# Patient Record
Sex: MALE | Race: WHITE
[De-identification: names, ages, dates, MRNs, and addresses within clinical notes are randomized per-mention and may not be internally consistent; named-entity substitution may affect disease eponyms.]

---

## 2017-12-23 ENCOUNTER — HOSPITAL ENCOUNTER (OUTPATIENT)
Dept: HOSPITAL 92 - SCSER | Age: 72
Setting detail: OBSERVATION
LOS: 1 days | Discharge: HOME | End: 2017-12-24
Attending: FAMILY MEDICINE | Admitting: FAMILY MEDICINE
Payer: MEDICARE

## 2017-12-23 VITALS — BODY MASS INDEX: 38.5 KG/M2

## 2017-12-23 DIAGNOSIS — Z79.52: ICD-10-CM

## 2017-12-23 DIAGNOSIS — J09.X2: ICD-10-CM

## 2017-12-23 DIAGNOSIS — E03.9: ICD-10-CM

## 2017-12-23 DIAGNOSIS — G70.00: ICD-10-CM

## 2017-12-23 DIAGNOSIS — R60.9: ICD-10-CM

## 2017-12-23 DIAGNOSIS — E78.5: ICD-10-CM

## 2017-12-23 DIAGNOSIS — I10: ICD-10-CM

## 2017-12-23 DIAGNOSIS — Z79.51: ICD-10-CM

## 2017-12-23 DIAGNOSIS — Z98.890: ICD-10-CM

## 2017-12-23 DIAGNOSIS — J44.9: ICD-10-CM

## 2017-12-23 DIAGNOSIS — Z87.891: ICD-10-CM

## 2017-12-23 DIAGNOSIS — Z88.1: ICD-10-CM

## 2017-12-23 DIAGNOSIS — Z88.0: ICD-10-CM

## 2017-12-23 DIAGNOSIS — Z79.899: ICD-10-CM

## 2017-12-23 DIAGNOSIS — E86.0: Primary | ICD-10-CM

## 2017-12-23 DIAGNOSIS — I25.10: ICD-10-CM

## 2017-12-23 LAB
ALBUMIN SERPL BCG-MCNC: 3.9 G/DL (ref 3.4–4.8)
ALP SERPL-CCNC: 59 U/L (ref 40–150)
ALT SERPL W P-5'-P-CCNC: 27 U/L (ref 8–55)
ANION GAP SERPL CALC-SCNC: 17 MMOL/L (ref 10–20)
AST SERPL-CCNC: 37 U/L (ref 5–34)
BASOPHILS # BLD AUTO: 0.2 THOU/UL (ref 0–0.2)
BASOPHILS NFR BLD AUTO: 1.8 % (ref 0–1)
BILIRUB SERPL-MCNC: 0.3 MG/DL (ref 0.2–1.2)
BUN SERPL-MCNC: 13 MG/DL (ref 8.4–25.7)
CALCIUM SERPL-MCNC: 9.2 MG/DL (ref 7.8–10.44)
CHLORIDE SERPL-SCNC: 102 MMOL/L (ref 98–107)
CK MB SERPL-MCNC: 1.5 NG/ML (ref 0–6.6)
CK MB SERPL-MCNC: 2.2 NG/ML (ref 0–6.6)
CK SERPL-CCNC: 173 U/L (ref 30–200)
CO2 SERPL-SCNC: 26 MMOL/L (ref 23–31)
CREAT CL PREDICTED SERPL C-G-VRATE: 0 ML/MIN (ref 70–130)
EOSINOPHIL # BLD AUTO: 0.2 THOU/UL (ref 0–0.7)
EOSINOPHIL NFR BLD AUTO: 2.4 % (ref 0–10)
GLOBULIN SER CALC-MCNC: 3.3 G/DL (ref 2.4–3.5)
GLUCOSE SERPL-MCNC: 98 MG/DL (ref 83–110)
HGB BLD-MCNC: 13.6 G/DL (ref 14–18)
LYMPHOCYTES # BLD: 1 THOU/UL (ref 1.2–3.4)
LYMPHOCYTES NFR BLD AUTO: 11.9 % (ref 21–51)
MCH RBC QN AUTO: 29 PG (ref 27–31)
MCV RBC AUTO: 88.4 FL (ref 80–94)
MONOCYTES # BLD AUTO: 1.2 THOU/UL (ref 0.11–0.59)
MONOCYTES NFR BLD AUTO: 14.7 % (ref 0–10)
NEUTROPHILS # BLD AUTO: 5.8 THOU/UL (ref 1.4–6.5)
NEUTROPHILS NFR BLD AUTO: 69.1 % (ref 42–75)
PLATELET # BLD AUTO: 184 THOU/UL (ref 130–400)
POTASSIUM SERPL-SCNC: 3.7 MMOL/L (ref 3.5–5.1)
RBC # BLD AUTO: 4.67 MILL/UL (ref 4.7–6.1)
SODIUM SERPL-SCNC: 141 MMOL/L (ref 136–145)
TROPONIN I SERPL DL<=0.01 NG/ML-MCNC: (no result) NG/ML (ref ?–0.03)
TROPONIN I SERPL DL<=0.01 NG/ML-MCNC: 0.03 NG/ML (ref ?–0.03)
WBC # BLD AUTO: 8.3 THOU/UL (ref 4.8–10.8)

## 2017-12-23 PROCEDURE — 36415 COLL VENOUS BLD VENIPUNCTURE: CPT

## 2017-12-23 PROCEDURE — 83605 ASSAY OF LACTIC ACID: CPT

## 2017-12-23 PROCEDURE — 82553 CREATINE MB FRACTION: CPT

## 2017-12-23 PROCEDURE — G0378 HOSPITAL OBSERVATION PER HR: HCPCS

## 2017-12-23 PROCEDURE — 82550 ASSAY OF CK (CPK): CPT

## 2017-12-23 PROCEDURE — 83880 ASSAY OF NATRIURETIC PEPTIDE: CPT

## 2017-12-23 PROCEDURE — 85025 COMPLETE CBC W/AUTO DIFF WBC: CPT

## 2017-12-23 PROCEDURE — 96361 HYDRATE IV INFUSION ADD-ON: CPT

## 2017-12-23 PROCEDURE — 80048 BASIC METABOLIC PNL TOTAL CA: CPT

## 2017-12-23 PROCEDURE — 99285 EMERGENCY DEPT VISIT HI MDM: CPT

## 2017-12-23 PROCEDURE — 94640 AIRWAY INHALATION TREATMENT: CPT

## 2017-12-23 PROCEDURE — 93005 ELECTROCARDIOGRAM TRACING: CPT

## 2017-12-23 PROCEDURE — 87804 INFLUENZA ASSAY W/OPTIC: CPT

## 2017-12-23 PROCEDURE — 96376 TX/PRO/DX INJ SAME DRUG ADON: CPT

## 2017-12-23 PROCEDURE — 87040 BLOOD CULTURE FOR BACTERIA: CPT

## 2017-12-23 PROCEDURE — 84484 ASSAY OF TROPONIN QUANT: CPT

## 2017-12-23 PROCEDURE — 94760 N-INVAS EAR/PLS OXIMETRY 1: CPT

## 2017-12-23 PROCEDURE — 80053 COMPREHEN METABOLIC PANEL: CPT

## 2017-12-23 PROCEDURE — 96374 THER/PROPH/DIAG INJ IV PUSH: CPT

## 2017-12-23 PROCEDURE — 93306 TTE W/DOPPLER COMPLETE: CPT

## 2017-12-23 PROCEDURE — 71020: CPT

## 2017-12-23 RX ADMIN — PYRIDOSTIGMINE BROMIDE SCH MG: 60 TABLET ORAL at 23:51

## 2017-12-23 NOTE — RAD
PA AND LATERAL CHEST:

 

Date:  12/23/17 

 

HISTORY:  

Dyspnea. Fever at home. 

 

FINDINGS:

 

Comparison made to previous exam from 01/07/10. 

 

Two views of chest demonstrate area of elevation of the left hemidiaphragm, unchanged since the previ
ous comparison exam. Some areas of scar seen in the right lung base. Mild pulmonary vascular congesti
on seen. No acute intrathoracic abnormalities noted. 

 

IMPRESSION: 

Elevated left hemidiaphragm. Otherwise unremarkable 2 views chest with areas of scarring seen in the 
right lung base. 

 

 

POS: [facility]

## 2017-12-23 NOTE — HP
PRIMARY CARE PHYSICIAN:  Dr. Orlando Hensley.

 

DATE OF ADMISSION:  12/23/2017

 

CHIEF COMPLAINT:  Cough, shortness of breath, and weakness.

 

HISTORY OF PRESENT ILLNESS:  This is a 72-year-old gentleman, a patient of Dr. Orlando Hensley with a histo
ry of COPD, coronary artery disease, myasthenia gravis, who presents to the Emergency Department with
 feeling worse over the past week.  The patient states that about 3 or 4 days ago, he developed worse
karo of his cough that seem different from his typical bronchitis, COPD exacerbation.  Two days ago, 
he developed increased body aches, body pains, muscle aches, malaise and 2 days ago, developed a feve
r up to 101 with chills.  He started his usual regimen of taking oral Ceftin 3-4 days ago with no rel
ief.  He admits to decreased appetite, but no nausea, vomiting, diarrhea, no falling at home, no sync
ope, but admits to increased dyspnea on exertion.  He presented to the Emergency Department today and
 was found to be hypoxic with a pulse ox down to 80% with activity.  He had a positive flu swab, now 
being admitted for fluid dehydration as well as mild exacerbation of his COPD.

 

PAST MEDICAL HISTORY:

1.  COPD, followed by Dr. Cruz.

2.  Myasthenia gravis followed by Dr. Gamboa

3.  Coronary artery disease followed by Dr. Connell.

4.  Chronic edema.

5.  Hypothyroidism.

6.  Hyperlipidemia.

 

PAST SURGICAL HISTORY:  Nasal polyp, sinus surgery, foot surgery.

 

MEDICATIONS:  Include, aspirin 81 mg daily, Crestor 20 mg daily, levothyroxine 75 mcg daily, metoprol
ol 20 mg daily, prednisone 5 mg b.i.d., Mestinon 90 mg t.i.d., Protonix 40 mg b.i.d., Symbicort 2 puf
fs b.i.d., Combivent as needed, and Ceftin as needed.

 

ALLERGIES:  Include LEVAQUIN and PENICILLIN.

 

SOCIAL HISTORY:  A former smoker, quit over 20 years ago, but smoked for over 25 years.  Denies alcoh
ol or illicit drug use.  He is , retired.

 

FAMILY HISTORY:  Multiple family members with coronary artery disease and diabetes.

 

REVIEW OF SYSTEMS:  As per the history of present illness.

GENERAL:  He denies any recent illness prior to 3-4 days ago.

HEENT:  Denies headache, visual or hearing changes.

CARDIOVASCULAR:  Denies chest pain.  Positive for shortness of breath, no palpitations.

PULMONARY:  Positive cough.  Positive chest congestion.  Denies hemoptysis.

GASTROINTESTINAL:  Denies nausea, vomiting, abdominal pain, melena, hematochezia.

GENITOURINARY:  Denies dysuria or hematuria.

NEUROLOGIC:  Positive weakness.  No seizures or syncope.

MUSCULOSKELETAL:  Occasional joint pains.

 

PHYSICAL EXAMINATION:

VITAL SIGNS:  Temperature 99.0, pulse of 98, respirations 20, pulse ox 93% to 95% on 2 liters, and bl
ood pressure 170/90.

GENERAL:  He is awake and alert, in no acute distress.  No conversational dyspnea.

NECK:  Supple.  Mucosa is dry.

HEART:  Regular rate and rhythm.

LUNGS:  With scattered rhonchi bilaterally with expiratory wheezes.

ABDOMEN:  Obese, soft, nontender, nondistended.

EXTREMITIES:  With 2-3+ edema with chronic venous stasis changes with ulcerations, some redness but n
o sign of acute cellulitis.

NEUROLOGIC:  Cranial nerves II through XII are grossly intact.  Strength is 5/5 bilaterally.

 

LABORATORY DATA:  White blood cell count 8.3000, hemoglobin and hematocrit 13.6 and 41.3, platelets o
f 184.  Sodium 141, potassium 3.7, chloride 102, CO2 of 26.  BUN and creatinine 13 and 0.72.  Serum g
lucose is 98.  GFR greater than 90.  AST and ALT are normal.  CPK was normal.  Troponin I was indeter
minate 0.030.  BNP was less than 10.

 

X-RAY FINDINGS:  Chest x-ray revealed elevated left hemidiaphragm, scarring in the right lung base, b
ut otherwise no acute infiltrate.

 

ASSESSMENT AND PLAN:  This is a 72-year-old gentleman with multiple medical problems including myasth
enia gravis, chronic obstructive pulmonary disease, coronary artery disease, now with acute viral ill
ness consistent with influenza type A, now with positive flu test.

1.  Influenza type A.  We will start Tamiflu 75 mg b.i.d.

2.  Dehydration.  We will start IV fluid management, try to push his fluids.

3.  Chronic obstructive pulmonary disease.  I will continue steroids, nebulizer treatments, as well a
s oxygen therapy.

4.  Coronary artery disease, stable.  We will continue his aspirin and Crestor.

5.  Hypertension.  We will monitor closely.  We will continue metoprolol and start hydralazine p.r.n.


6.  Disposition:  Hope for a short hospital stay.  Once he is improving, hopefully discharge home moon
izaiah.

## 2017-12-24 VITALS — TEMPERATURE: 97.6 F | SYSTOLIC BLOOD PRESSURE: 146 MMHG | DIASTOLIC BLOOD PRESSURE: 78 MMHG

## 2017-12-24 LAB
ANION GAP SERPL CALC-SCNC: 10 MMOL/L (ref 10–20)
BASOPHILS # BLD AUTO: 0 THOU/UL (ref 0–0.2)
BASOPHILS NFR BLD AUTO: 0 % (ref 0–1)
BUN SERPL-MCNC: 14 MG/DL (ref 8.4–25.7)
CALCIUM SERPL-MCNC: 8.7 MG/DL (ref 7.8–10.44)
CHLORIDE SERPL-SCNC: 102 MMOL/L (ref 98–107)
CO2 SERPL-SCNC: 31 MMOL/L (ref 23–31)
CREAT CL PREDICTED SERPL C-G-VRATE: 192 ML/MIN (ref 70–130)
EOSINOPHIL # BLD AUTO: 0 THOU/UL (ref 0–0.7)
EOSINOPHIL NFR BLD AUTO: 0.3 % (ref 0–10)
GLUCOSE SERPL-MCNC: 127 MG/DL (ref 83–110)
HGB BLD-MCNC: 13.1 G/DL (ref 14–18)
LYMPHOCYTES # BLD: 0.4 THOU/UL (ref 1.2–3.4)
LYMPHOCYTES NFR BLD AUTO: 10.1 % (ref 21–51)
MCH RBC QN AUTO: 29.5 PG (ref 27–31)
MCV RBC AUTO: 92.8 FL (ref 80–94)
MONOCYTES # BLD AUTO: 0.2 THOU/UL (ref 0.11–0.59)
MONOCYTES NFR BLD AUTO: 4.4 % (ref 0–10)
NEUTROPHILS # BLD AUTO: 3.4 THOU/UL (ref 1.4–6.5)
NEUTROPHILS NFR BLD AUTO: 85.1 % (ref 42–75)
PLATELET # BLD AUTO: 199 THOU/UL (ref 130–400)
POTASSIUM SERPL-SCNC: 4 MMOL/L (ref 3.5–5.1)
RBC # BLD AUTO: 4.46 MILL/UL (ref 4.7–6.1)
SODIUM SERPL-SCNC: 139 MMOL/L (ref 136–145)
WBC # BLD AUTO: 4 THOU/UL (ref 4.8–10.8)

## 2017-12-24 RX ADMIN — PYRIDOSTIGMINE BROMIDE SCH MG: 60 TABLET ORAL at 08:04

## 2017-12-24 NOTE — DIS
DATE OF ADMISSION:  12/23/2017

 

DATE OF DISCHARGE:  12/24/2017

 

ADMISSION DIAGNOSES:

1.  Influenza.

2.  Dehydration.

3.  Chronic obstructive pulmonary disease.

4.  Coronary artery disease with slightly elevated troponin.

 

DISCHARGE DIAGNOSES:

1.  Dehydration, resolved.

2.  Influenza, stable.

3.  Chronic obstructive pulmonary disease at his baseline.

 

PROCEDURES:  Telemetry monitoring rule out myocardial infarction protocol, chest x-ray, Tamiflu thera
py.

 

HOSPITAL COURSE:  This is a 72-year-old gentleman with a history of COPD, coronary artery disease, an
d with myasthenia gravis, who presented to the emergency department with worsening cough, malaise, an
d fever over the past week.  He presented to the emergency department, was hypoxic with a pulse ox do
wn to 80% with walking around.  He has a positive flu swab.  He was started on Tamiflu, as well as IV
 fluid rehydration, steroids, and neb treatments.  He improved during his hospitalization.  He feels 
much better, close to his baseline.  His lung exam improved and he was able to walk to the bathroom w
ith less difficulty.  He is tolerating the Tamiflu, taking fluids well now, and stable for discharge.


 

DISCHARGE PHYSICAL EXAMINATION:

VITAL SIGNS:  Temperature 97.6, T-max of 99, pulse of 78, respirations 18, blood pressure 146/78, pul
se ox is 91% on 3 liters.

GENERAL:  He is awake and alert.  No conversational dyspnea.

NECK:  Supple.

HEART:  Regular rate and rhythm.

LUNGS:  With distant breath sounds, occasional wheezes but no rhonchi.

ABDOMEN:  Obese.

EXTREMITIES:  With 1-2+ edema, but he states that is his baseline.

 

LABORATORY DATA:  Troponin I less than 0.01.  BNP was less than 10.  Sodium 139, potassium 4.0, chlor
gregory 102, CO2 of 31, BUN and creatinine 14 and 0.6, serum glucose of 127. White blood cell count 4.0, 
hemoglobin and hematocrit 13.1 and 41.4, platelets of 199.

 

DISCHARGE MEDICATIONS:  Include DuoNeb p.r.n., Mucinex 600 mg q.12 hours, hydralazine 25 mg q.i.d. p.
r.n., levothyroxine 75 mcg daily, a steroid taper, Lopressor 25 mg daily, Tamiflu 75 mg b.i.d. for 4 
more days, Protonix 40 mg daily, Mestinon 90 mg t.i.d., Symbicort b.i.d.

 

FOLLOWUP INSTRUCTIONS:  The patient to follow up with Dr. Hensley in 1-2 weeks.

## 2018-02-10 NOTE — EKG
Test Reason : 

Blood Pressure : ***/*** mmHG

Vent. Rate : 106 BPM     Atrial Rate : 106 BPM

   P-R Int : 164 ms          QRS Dur : 094 ms

    QT Int : 334 ms       P-R-T Axes : 024 012 115 degrees

   QTc Int : 443 ms

 

Sinus tachycardia



Abnormal ECG

 

Confirmed by XAVI PENDLETON ([id number]),  PARVIN GAVIN ([id number]) on 2/10/2018 6:23:55 PM

 

Referred By:             Confirmed By:XAVI PENDLETON

## 2019-02-19 ENCOUNTER — HOSPITAL ENCOUNTER (OUTPATIENT)
Dept: HOSPITAL 92 - SCSULT | Age: 74
Discharge: HOME | End: 2019-02-19
Attending: FAMILY MEDICINE
Payer: MEDICARE

## 2019-02-19 DIAGNOSIS — Z13.6: Primary | ICD-10-CM

## 2019-02-19 PROCEDURE — 76775 US EXAM ABDO BACK WALL LIM: CPT

## 2019-02-19 PROCEDURE — 76706 US ABDL AORTA SCREEN AAA: CPT

## 2019-02-19 NOTE — ULT
ABDOMINAL AORTIC ULTRASOUND:

 

TECHNIQUE: 

Multiplanar, gray scale, and color Doppler images were obtained in a limited ultrasound of the abdomi
nal aorta.  Spectral analysis of the Doppler waveforms of the aorta and common iliac arteries was per
formed.

 

FINDINGS: 

The aorta is normal in caliber without significant atherosclerotic disease.  The aorta measures 2.5 c
m in greatest dimension proximally.  No aneurysmal dilatation of the common iliac arteries is seen.  
The waveforms and aorta and iliac arteries are normal.

 

IMPRESSION: 

No evidence of abdominal aortic aneurysm.

 

POS: [facility]

## 2019-11-29 ENCOUNTER — HOSPITAL ENCOUNTER (EMERGENCY)
Dept: HOSPITAL 92 - SCSER | Age: 74
Discharge: HOME | End: 2019-11-29
Payer: MEDICARE

## 2019-11-29 DIAGNOSIS — Z79.51: ICD-10-CM

## 2019-11-29 DIAGNOSIS — I25.10: ICD-10-CM

## 2019-11-29 DIAGNOSIS — R19.7: ICD-10-CM

## 2019-11-29 DIAGNOSIS — E03.9: ICD-10-CM

## 2019-11-29 DIAGNOSIS — Z79.899: ICD-10-CM

## 2019-11-29 DIAGNOSIS — I25.2: ICD-10-CM

## 2019-11-29 DIAGNOSIS — R11.2: Primary | ICD-10-CM

## 2019-11-29 DIAGNOSIS — Z87.891: ICD-10-CM

## 2019-11-29 DIAGNOSIS — Z79.52: ICD-10-CM

## 2019-11-29 DIAGNOSIS — E78.00: ICD-10-CM

## 2019-11-29 DIAGNOSIS — Z79.82: ICD-10-CM

## 2019-11-29 DIAGNOSIS — I10: ICD-10-CM

## 2019-11-29 DIAGNOSIS — J44.9: ICD-10-CM

## 2019-11-29 LAB
ALBUMIN SERPL BCG-MCNC: 3.7 G/DL (ref 3.4–4.8)
ALP SERPL-CCNC: 70 U/L (ref 40–110)
ALT SERPL W P-5'-P-CCNC: 17 U/L (ref 8–55)
ANION GAP SERPL CALC-SCNC: 13 MMOL/L (ref 10–20)
AST SERPL-CCNC: 20 U/L (ref 5–34)
BASOPHILS # BLD AUTO: 0.1 THOU/UL (ref 0–0.2)
BASOPHILS NFR BLD AUTO: 0.7 % (ref 0–1)
BILIRUB SERPL-MCNC: 0.6 MG/DL (ref 0.2–1.2)
BUN SERPL-MCNC: 16 MG/DL (ref 8.4–25.7)
BURR CELLS BLD QL SMEAR: (no result) (100X)
CALCIUM SERPL-MCNC: 8.9 MG/DL (ref 7.8–10.44)
CHLORIDE SERPL-SCNC: 102 MMOL/L (ref 98–107)
CO2 SERPL-SCNC: 34 MMOL/L (ref 23–31)
CREAT CL PREDICTED SERPL C-G-VRATE: 0 ML/MIN (ref 70–130)
EOSINOPHIL # BLD AUTO: 0.4 THOU/UL (ref 0–0.7)
EOSINOPHIL NFR BLD AUTO: 3 % (ref 0–10)
GLOBULIN SER CALC-MCNC: 3.1 G/DL (ref 2.4–3.5)
GLUCOSE SERPL-MCNC: 96 MG/DL (ref 83–110)
HGB BLD-MCNC: 9.5 G/DL (ref 14–18)
LIPASE SERPL-CCNC: 26 U/L (ref 8–78)
LYMPHOCYTES # BLD: 0.6 THOU/UL (ref 1.2–3.4)
LYMPHOCYTES NFR BLD AUTO: 4.5 % (ref 21–51)
MCH RBC QN AUTO: 19.4 PG (ref 27–31)
MCV RBC AUTO: 72.4 FL (ref 78–98)
MDIFF COMPLETE?: YES
MICROCYTES BLD QL SMEAR: (no result) (100X)
MONOCYTES # BLD AUTO: 1.3 THOU/UL (ref 0.11–0.59)
MONOCYTES NFR BLD AUTO: 10 % (ref 0–10)
NEUTROPHILS # BLD AUTO: 10.8 THOU/UL (ref 1.4–6.5)
NEUTROPHILS NFR BLD AUTO: 81.7 % (ref 42–75)
OVALOCYTES BLD QL SMEAR: (no result) (100X)
PLATELET # BLD AUTO: 312 THOU/UL (ref 130–400)
POLYCHROMASIA BLD QL SMEAR: (no result) (100X)
POTASSIUM SERPL-SCNC: 3.7 MMOL/L (ref 3.5–5.1)
RBC # BLD AUTO: 4.89 MILL/UL (ref 4.7–6.1)
SODIUM SERPL-SCNC: 145 MMOL/L (ref 136–145)
WBC # BLD AUTO: 13.2 THOU/UL (ref 4.8–10.8)

## 2019-11-29 PROCEDURE — 94760 N-INVAS EAR/PLS OXIMETRY 1: CPT

## 2019-11-29 PROCEDURE — 84484 ASSAY OF TROPONIN QUANT: CPT

## 2019-11-29 PROCEDURE — 83690 ASSAY OF LIPASE: CPT

## 2019-11-29 PROCEDURE — 71046 X-RAY EXAM CHEST 2 VIEWS: CPT

## 2019-11-29 PROCEDURE — 93005 ELECTROCARDIOGRAM TRACING: CPT

## 2019-11-29 PROCEDURE — 96374 THER/PROPH/DIAG INJ IV PUSH: CPT

## 2019-11-29 PROCEDURE — 96361 HYDRATE IV INFUSION ADD-ON: CPT

## 2019-11-29 PROCEDURE — 71045 X-RAY EXAM CHEST 1 VIEW: CPT

## 2019-11-29 PROCEDURE — 83735 ASSAY OF MAGNESIUM: CPT

## 2019-11-29 PROCEDURE — 80053 COMPREHEN METABOLIC PANEL: CPT

## 2019-11-29 PROCEDURE — 85025 COMPLETE CBC W/AUTO DIFF WBC: CPT

## 2019-11-29 NOTE — RAD
RADIOGRAPH CHEST 2 VIEW:



DATE:

11/29/2019



TIME:

4:25 PM



HISTORY:

74-year-old male with cough



COMPARISON:

12/22/2017



FINDINGS:

No interval change in the dense opacification at the left base, silhouetting the left heart border on
 the frontal view. CT of abdomen of 3/18/2016 shows that this represents a combination of elevated

left hemidiaphragm and large left paracardial fat pad. Likewise, streaky density mildly partially edith
vating right cardiac border represents pulmonary scar, unchanged. Lateral view demonstrates no

pleural effusion. Cardiac shadow is obscured by the findings described above. The upper lung zones de
monstrate no consolidation. No pneumothorax. No interval change overall.



IMPRESSION:

1) no acute findings.

2) no interval change compared to 12/23/2017.

3) chronic changes at bilateral lung bases as described above.



Reported By: Bull Lassiter 

Electronically Signed:  11/29/2019 4:47 PM

## 2019-11-29 NOTE — RAD
PORTABLE CHEST:

 

INDICATION: 

Cough.

 

COMPARISON: 

3/22/2016.

 

FINDINGS: 

Bibasilar infiltrates and/or atelectasis.  I cannot exclude small effusions.  There is cardiomegaly. 
 Vascular markings within normal range.  

 

IMPRESSION: 

Bibasilar atelectasis and/or infiltrates.  Consider upright PA and lateral views for better evaluatio
n.

 

POS: [facility]

## 2020-08-26 ENCOUNTER — HOSPITAL ENCOUNTER (EMERGENCY)
Dept: HOSPITAL 92 - ERS | Age: 75
End: 2020-08-26
Payer: MEDICARE

## 2020-08-26 DIAGNOSIS — I10: ICD-10-CM

## 2020-08-26 DIAGNOSIS — I46.9: Primary | ICD-10-CM

## 2020-08-26 DIAGNOSIS — E78.5: ICD-10-CM

## 2020-08-26 DIAGNOSIS — I25.10: ICD-10-CM

## 2020-08-26 DIAGNOSIS — J44.9: ICD-10-CM

## 2020-08-26 DIAGNOSIS — E03.9: ICD-10-CM

## 2020-08-26 DIAGNOSIS — Z87.891: ICD-10-CM

## 2020-08-26 LAB
ALBUMIN SERPL BCG-MCNC: 2.6 G/DL (ref 3.4–4.8)
ALP SERPL-CCNC: 83 U/L (ref 40–110)
ALT SERPL W P-5'-P-CCNC: 82 U/L (ref 8–55)
ANION GAP SERPL CALC-SCNC: 20 MMOL/L (ref 10–20)
APTT PPP: 43.4 SEC (ref 22.9–36.1)
AST SERPL-CCNC: 48 U/L (ref 5–34)
BILIRUB SERPL-MCNC: 0.3 MG/DL (ref 0.2–1.2)
BUN SERPL-MCNC: 22 MG/DL (ref 8.4–25.7)
CALCIUM SERPL-MCNC: 10.3 MG/DL (ref 7.8–10.44)
CHLORIDE SERPL-SCNC: 98 MMOL/L (ref 98–107)
CO2 SERPL-SCNC: 36 MMOL/L (ref 23–31)
CREAT CL PREDICTED SERPL C-G-VRATE: 0 ML/MIN (ref 70–130)
D DIMER PPP FEU-MCNC: (no result) *MCG/ML (ref 0.27–0.43)
GLOBULIN SER CALC-MCNC: 1.8 G/DL (ref 2.4–3.5)
GLUCOSE SERPL-MCNC: 97 MG/DL (ref 83–110)
HGB BLD-MCNC: 8.6 G/DL (ref 14–18)
INR PPP: 1.5
MCH RBC QN AUTO: 20.4 PG (ref 27–31)
MCV RBC AUTO: 78.9 FL (ref 78–98)
MDIFF COMPLETE?: YES
PLATELET # BLD AUTO: 147 THOU/UL (ref 130–400)
POTASSIUM SERPL-SCNC: 5.5 MMOL/L (ref 3.5–5.1)
PROTHROMBIN TIME: 17.8 SEC (ref 12–14.7)
RBC # BLD AUTO: 4.2 MILL/UL (ref 4.7–6.1)
REFLEX FOR REVIEW??: YES
SODIUM SERPL-SCNC: 148 MMOL/L (ref 136–145)
WBC # BLD AUTO: 14.1 THOU/UL (ref 4.8–10.8)

## 2020-08-26 PROCEDURE — 84484 ASSAY OF TROPONIN QUANT: CPT

## 2020-08-26 PROCEDURE — 87040 BLOOD CULTURE FOR BACTERIA: CPT

## 2020-08-26 PROCEDURE — 92950 HEART/LUNG RESUSCITATION CPR: CPT

## 2020-08-26 PROCEDURE — 85730 THROMBOPLASTIN TIME PARTIAL: CPT

## 2020-08-26 PROCEDURE — 93005 ELECTROCARDIOGRAM TRACING: CPT

## 2020-08-26 PROCEDURE — 96374 THER/PROPH/DIAG INJ IV PUSH: CPT

## 2020-08-26 PROCEDURE — 85060 BLOOD SMEAR INTERPRETATION: CPT

## 2020-08-26 PROCEDURE — 85025 COMPLETE CBC W/AUTO DIFF WBC: CPT

## 2020-08-26 PROCEDURE — 80053 COMPREHEN METABOLIC PANEL: CPT

## 2020-08-26 PROCEDURE — 51702 INSERT TEMP BLADDER CATH: CPT

## 2020-08-26 PROCEDURE — 85610 PROTHROMBIN TIME: CPT

## 2020-08-26 PROCEDURE — 36556 INSERT NON-TUNNEL CV CATH: CPT

## 2020-08-26 PROCEDURE — 83605 ASSAY OF LACTIC ACID: CPT

## 2020-08-26 PROCEDURE — 84443 ASSAY THYROID STIM HORMONE: CPT

## 2020-08-26 PROCEDURE — 71045 X-RAY EXAM CHEST 1 VIEW: CPT

## 2020-08-26 PROCEDURE — 83880 ASSAY OF NATRIURETIC PEPTIDE: CPT

## 2020-08-26 PROCEDURE — 96376 TX/PRO/DX INJ SAME DRUG ADON: CPT

## 2020-08-26 PROCEDURE — 85379 FIBRIN DEGRADATION QUANT: CPT

## 2020-08-26 PROCEDURE — 31500 INSERT EMERGENCY AIRWAY: CPT

## 2020-08-26 PROCEDURE — 96375 TX/PRO/DX INJ NEW DRUG ADDON: CPT

## 2020-08-26 NOTE — RAD
CHEST 1 VIEW:



HISTORY:

Intubation. Respiratory distress. CPR in progress.



Comparison: 

8/14/2020, 8/19/2020.



FINDINGS:

Cardiac silhouette:Cardiomegaly.

Lines and tubes: Endotracheal tube appears to be at the level of monse. Nasogastric tube terminates 
in the left upper quadrant.

Aorta: Slightly elongated.

Pulmonary vessels: Normal.

Costophrenic angles: Bilateral pleural effusions.

Lungs: Bilateral perihilar alveolar opacities.

Pneumothorax: None.

Osseous abnormalities: None.



IMPRESSION: 

1. Congestive heart failure is suspected. Superimposed pneumonia and/or aspiration cannot be excluded
.

2. Endotracheal and nasogastric tube as above. Need to reposition the endotracheal tube is recommende
d. 



Results of the study conveyed to charge nurse, Olayinka, 8/26/2020 at 7:43 AM.



Code CR



Transcribed Date/Time: 8/26/2020 8:25 AM



Reported By: Diana Ricks 

Electronically Signed:  8/26/2020 12:08 PM

## 2020-08-29 NOTE — EKG
Test Reason : 

Blood Pressure : ***/*** mmHG

Vent. Rate : 153 BPM     Atrial Rate : 170 BPM

   P-R Int : 000 ms          QRS Dur : 126 ms

    QT Int : 312 ms       P-R-T Axes : 000 060 027 degrees

   QTc Int : 498 ms

 

Atrial fibrillation with rapid ventricular response

Non-specific intra-ventricular conduction block

Inferior infarct , possibly acute

Anterolateral injury pattern

Abnormal ECG

 

Confirmed by TREMAYNE JACOBSEN ([id number]),  BRITTANIE WILSON ([id number]) on 8/29/2020 11:55:08 AM

 

Referred By:             Confirmed By:TREMAYNE JACOBSEN